# Patient Record
Sex: MALE | Race: BLACK OR AFRICAN AMERICAN | Employment: FULL TIME | URBAN - METROPOLITAN AREA
[De-identification: names, ages, dates, MRNs, and addresses within clinical notes are randomized per-mention and may not be internally consistent; named-entity substitution may affect disease eponyms.]

---

## 2020-05-29 ENCOUNTER — HOSPITAL ENCOUNTER (EMERGENCY)
Age: 20
Discharge: HOME | End: 2020-05-29
Attending: EMERGENCY MEDICINE
Payer: SELF-PAY

## 2020-05-29 VITALS
TEMPERATURE: 98.6 F | BODY MASS INDEX: 22.53 KG/M2 | HEIGHT: 73 IN | SYSTOLIC BLOOD PRESSURE: 117 MMHG | RESPIRATION RATE: 16 BRPM | WEIGHT: 170 LBS | OXYGEN SATURATION: 100 % | HEART RATE: 82 BPM | DIASTOLIC BLOOD PRESSURE: 74 MMHG

## 2020-05-29 DIAGNOSIS — L02.92 CARBUNCLE AND FURUNCLE: Primary | ICD-10-CM

## 2020-05-29 DIAGNOSIS — L02.93 CARBUNCLE AND FURUNCLE: Primary | ICD-10-CM

## 2020-05-29 PROCEDURE — 99282 EMERGENCY DEPT VISIT SF MDM: CPT

## 2020-05-29 RX ORDER — MUPIROCIN CALCIUM 20 MG/G
CREAM TOPICAL 2 TIMES DAILY
Qty: 15 G | Refills: 0 | Status: SHIPPED | OUTPATIENT
Start: 2020-05-29

## 2020-05-29 RX ORDER — CEPHALEXIN 500 MG/1
500 CAPSULE ORAL 3 TIMES DAILY
Qty: 30 CAP | Refills: 0 | Status: SHIPPED | OUTPATIENT
Start: 2020-05-29 | End: 2020-06-08

## 2020-05-29 NOTE — ED TRIAGE NOTES
Pt ambulatory to triage with mask, c/o \"multiple lumps\"  In groin area, states wants to see if he has hernias. States lumps have been there x5-6 months ago, pain started approx 1-2 months ago. Has not taken any pain medications.

## 2020-05-30 NOTE — ED PROVIDER NOTES
The patient is a 30-year-old female with a past medical history significant for asthma and nosebleed who presents to the ED with a complaint of several bumps located in his groin area that he has had for approximately 3 months he wanted to find out what they were. He denies any fever and chills, sore throat, cough or congestion, headache,. Neck and back pain, chest pain, shortness of breath, nausea, vomiting, dizziness, dysuria, history of STDs and further treatment. Past Medical History:   Diagnosis Date    Asthma     Nosebleed        No past surgical history on file.       Family History:   Problem Relation Age of Onset    Diabetes Father     Hypertension Father     Cancer Paternal Grandmother         breast    Diabetes Paternal Grandfather        Social History     Socioeconomic History    Marital status: SINGLE     Spouse name: Not on file    Number of children: Not on file    Years of education: Not on file    Highest education level: Not on file   Occupational History    Not on file   Social Needs    Financial resource strain: Not on file    Food insecurity     Worry: Not on file     Inability: Not on file    Transportation needs     Medical: Not on file     Non-medical: Not on file   Tobacco Use    Smoking status: Never Smoker    Smokeless tobacco: Never Used   Substance and Sexual Activity    Alcohol use: No    Drug use: No    Sexual activity: Not on file   Lifestyle    Physical activity     Days per week: Not on file     Minutes per session: Not on file    Stress: Not on file   Relationships    Social connections     Talks on phone: Not on file     Gets together: Not on file     Attends Spiritism service: Not on file     Active member of club or organization: Not on file     Attends meetings of clubs or organizations: Not on file     Relationship status: Not on file    Intimate partner violence     Fear of current or ex partner: Not on file     Emotionally abused: Not on file     Physically abused: Not on file     Forced sexual activity: Not on file   Other Topics Concern    Not on file   Social History Narrative    Not on file         ALLERGIES: Milk; Shellfish derived; and Soy    Review of Systems   All other systems reviewed and are negative. Vitals:    05/29/20 1942   BP: 117/74   Pulse: 82   Resp: 16   Temp: 98.6 °F (37 °C)   SpO2: 100%   Weight: 77.1 kg (170 lb)   Height: 6' 1\" (1.854 m)            Physical Exam  Vitals signs and nursing note reviewed. Exam conducted with a chaperone present. CONSTITUTIONAL: Well-appearing; well-nourished; in no apparent distress  HEAD: Normocephalic; atraumatic  EYES: PERRL; EOM intact; conjunctiva and sclera are clear bilaterally. ENT: No rhinorrhea; normal pharynx with no tonsillar hypertrophy; mucous membranes pink/moist, no erythema, no exudate. NECK: Supple; non-tender; no cervical lymphadenopathy  CARD: Normal S1, S2; no murmurs, rubs, or gallops. Regular rate and rhythm. RESP: Normal respiratory effort; breath sounds clear and equal bilaterally; no wheezes, rhonchi, or rales. ABD: Normal bowel sounds; non-distended; non-tender; no palpable organomegaly, no masses, no bruits. Back Exam: Normal inspection; no vertebral point tenderness, no CVA tenderness. Normal range of motion. EXT: Normal ROM in all four extremities; non-tender to palpation; no swelling or deformity; distal pulses are normal, no edema. SKIN: Warm; dry; multiple cutaneous indurated lesions in the suprapubic area consistent with carbuncle. NEURO:Alert and oriented x 3, coherent, SHASHI-XII grossly intact, sensory and motor are non-focal.        MDM  Number of Diagnoses or Management Options  Carbuncle and furuncle:   Diagnosis management comments: Assessment: Carbuncle of suprapubic area    Plan: Education, reassurance, symptomatic treatment/antibiotic and outpatient referral/ Monitor and Reevaluate.          Amount and/or Complexity of Data Reviewed  Clinical lab tests: reviewed and ordered  Tests in the radiology section of CPT®: ordered and reviewed  Tests in the medicine section of CPT®: ordered and reviewed  Discussion of test results with the performing providers: yes  Decide to obtain previous medical records or to obtain history from someone other than the patient: yes  Obtain history from someone other than the patient: yes  Review and summarize past medical records: yes  Discuss the patient with other providers: yes  Independent visualization of images, tracings, or specimens: yes    Risk of Complications, Morbidity, and/or Mortality  Presenting problems: low  Diagnostic procedures: low  Management options: low    Patient Progress  Patient progress: stable         Procedures    Progress Note:   Pt has been reexamined by Vero Sprague MD. Pt is feeling much better. Symptoms have improved. All available results have been reviewed with pt and any available family. Pt understands sx, dx, and tx in ED. Care plan has been outlined and questions have been answered. Pt is ready to go home. Will send home on carbuncle of groin instruction. Prescription of Keflex and Bactroban ointment. Outpatient referral with PCP/general surgery for reevaluation and further treatment as needed. Written by Vero Sprague MD,8:33 PM    .   .